# Patient Record
Sex: MALE | Race: BLACK OR AFRICAN AMERICAN | NOT HISPANIC OR LATINO | URBAN - METROPOLITAN AREA
[De-identification: names, ages, dates, MRNs, and addresses within clinical notes are randomized per-mention and may not be internally consistent; named-entity substitution may affect disease eponyms.]

---

## 2024-02-01 ENCOUNTER — APPOINTMENT (RX ONLY)
Dept: URBAN - METROPOLITAN AREA CLINIC 162 | Facility: CLINIC | Age: 74
Setting detail: DERMATOLOGY
End: 2024-02-01

## 2024-02-01 DIAGNOSIS — L81.0 POSTINFLAMMATORY HYPERPIGMENTATION: ICD-10-CM

## 2024-02-01 DIAGNOSIS — L64.8 OTHER ANDROGENIC ALOPECIA: ICD-10-CM

## 2024-02-01 DIAGNOSIS — L65.9 NONSCARRING HAIR LOSS, UNSPECIFIED: ICD-10-CM

## 2024-02-01 PROCEDURE — ? DIAGNOSIS COMMENT

## 2024-02-01 PROCEDURE — ? PRESCRIPTION

## 2024-02-01 PROCEDURE — ? ADDITIONAL NOTES

## 2024-02-01 PROCEDURE — 99213 OFFICE O/P EST LOW 20 MIN: CPT

## 2024-02-01 PROCEDURE — ? COUNSELING

## 2024-02-01 RX ORDER — MINOXIDIL 2.5 MG/1
TABLET ORAL
Qty: 15 | Refills: 5 | Status: ERX | COMMUNITY
Start: 2024-02-01

## 2024-02-01 RX ADMIN — MINOXIDIL: 2.5 TABLET ORAL at 00:00

## 2024-02-01 ASSESSMENT — LOCATION SIMPLE DESCRIPTION DERM
LOCATION SIMPLE: POSTERIOR NECK
LOCATION SIMPLE: FRONTAL SCALP

## 2024-02-01 ASSESSMENT — LOCATION ZONE DERM
LOCATION ZONE: SCALP
LOCATION ZONE: NECK

## 2024-02-01 ASSESSMENT — LOCATION DETAILED DESCRIPTION DERM
LOCATION DETAILED: MEDIAL FRONTAL SCALP
LOCATION DETAILED: MID POSTERIOR NECK

## 2024-02-01 NOTE — PROCEDURE: COUNSELING
Patient Specific Counseling (Will Not Stick From Patient To Patient): **PIH from resolved rash
Detail Level: Zone
Sunscreen Recommendations: Recommend daily spf 30+ such as Cerave am, Eucerin daily, Cetaphil oil absorbing, La Roche Posay Anhelios
Topical Retinoids Recommendations: Continue tretinoin, use \"pea-sized\" amount, avoid irritating the face
Patient Specific Counseling (Will Not Stick From Patient To Patient): ***pt admitted to stage 1 kidney damage, advised to keep eye on BP, pt has appt with PCP this month, advised to discuss minoxidil with losartan

## 2024-02-01 NOTE — PROCEDURE: DIAGNOSIS COMMENT
Comment: Starting on minoxidil 1.25mg daily(1/2 of a 2.5mg tablet) discussed talking with pcp as he has some early kidney disease and is on losartan
Render Risk Assessment In Note?: no
Detail Level: Simple

## 2024-02-01 NOTE — PROCEDURE: ADDITIONAL NOTES
Additional Notes: Patch matches the back of the strap.  Most likely caused from sleep apnea strap, hair thinning and possible irritation from seams on strap\\n*rec wearing something protective underneath strap every night, soft cotton, silk or satin head wrap should keep the rough seams on the strap from pulling the hairs.\\n*rec OTC HC if irritation occurs
Render Risk Assessment In Note?: no
Detail Level: Simple
Fall with Harm Risk

## 2024-02-20 ENCOUNTER — RX ONLY (OUTPATIENT)
Age: 74
Setting detail: RX ONLY
End: 2024-02-20

## 2024-02-20 RX ORDER — MINOXIDIL 2.5 MG/1
TABLET ORAL
Qty: 60 | Refills: 1 | Status: ERX

## 2024-07-06 ENCOUNTER — RX ONLY (OUTPATIENT)
Age: 74
Setting detail: RX ONLY
End: 2024-07-06

## 2024-07-06 RX ORDER — MINOXIDIL 2.5 MG/1
TABLET ORAL
Qty: 15 | Refills: 0 | Status: ERX

## 2024-08-22 ENCOUNTER — APPOINTMENT (RX ONLY)
Dept: URBAN - METROPOLITAN AREA CLINIC 162 | Facility: CLINIC | Age: 74
Setting detail: DERMATOLOGY
End: 2024-08-22

## 2024-08-22 DIAGNOSIS — L64.8 OTHER ANDROGENIC ALOPECIA: ICD-10-CM

## 2024-08-22 PROCEDURE — ? OTC TREATMENT REGIMEN

## 2024-08-22 PROCEDURE — ? PRESCRIPTION

## 2024-08-22 PROCEDURE — ? DEFER

## 2024-08-22 PROCEDURE — ? LAB REPORTS REVIEWED

## 2024-08-22 PROCEDURE — ? ADDITIONAL NOTES

## 2024-08-22 PROCEDURE — ? COUNSELING

## 2024-08-22 PROCEDURE — 99214 OFFICE O/P EST MOD 30 MIN: CPT

## 2024-08-22 RX ORDER — MINOXIDIL 2.5 MG/1
TABLET ORAL
Qty: 90 | Refills: 0 | Status: ERX

## 2024-08-22 ASSESSMENT — LOCATION SIMPLE DESCRIPTION DERM: LOCATION SIMPLE: POSTERIOR SCALP

## 2024-08-22 ASSESSMENT — LOCATION DETAILED DESCRIPTION DERM: LOCATION DETAILED: POSTERIOR MID-PARIETAL SCALP

## 2024-08-22 ASSESSMENT — LOCATION ZONE DERM: LOCATION ZONE: SCALP

## 2024-08-22 NOTE — PROCEDURE: DEFER
X Size Of Lesion In Cm (Optional): 0
Reason To Defer Override: patient declines use of topical prescription options
Introduction Text (Please End With A Colon): The following procedure was deferred:
Detail Level: Detailed

## 2024-08-22 NOTE — PROCEDURE: LAB REPORTS REVIEWED
Summarized Lab Results: /80\\n\\n5/7/24 - most recent blood work \\nCreatine (1.11) wnl \\nGfr (71) wnl\\nLFT (wnl)
Detail Level: Zone

## 2024-08-22 NOTE — PROCEDURE: ADDITIONAL NOTES
Detail Level: Simple
Render Risk Assessment In Note?: no
Additional Notes: Discussed use of black clover for hair dye

## 2024-08-22 NOTE — HPI: OTHER
Condition:: Alopecia
Please Describe Your Condition:: Scalp, patient is taking 1/2 tab of minoxidil 2.5mg \\nPatient is not seeing any improvement

## 2024-08-22 NOTE — PROCEDURE: COUNSELING
Detail Level: Zone
Patient Specific Counseling (Will Not Stick From Patient To Patient): **Discussed expectations with patient with hair loss and attempt to retain hair remaining that patient has, reviewed that regrowth is not expected in areas that patient has lost hair in.

## 2024-08-22 NOTE — PROCEDURE: OTC TREATMENT REGIMEN
Detail Level: Zone
Patient Specific Otc Recommendations (Will Not Stick From Patient To Patient): Nutrafol use